# Patient Record
Sex: FEMALE | Race: WHITE | URBAN - METROPOLITAN AREA
[De-identification: names, ages, dates, MRNs, and addresses within clinical notes are randomized per-mention and may not be internally consistent; named-entity substitution may affect disease eponyms.]

---

## 2017-07-11 ENCOUNTER — OFFICE VISIT (OUTPATIENT)
Dept: OPHTHALMOLOGY | Facility: CLINIC | Age: 72
End: 2017-07-11

## 2017-07-11 DIAGNOSIS — H52.203 MYOPIC ASTIGMATISM OF BOTH EYES: ICD-10-CM

## 2017-07-11 DIAGNOSIS — H25.13 AGE-RELATED NUCLEAR CATARACT OF BOTH EYES: Primary | ICD-10-CM

## 2017-07-11 DIAGNOSIS — H52.4 PRESBYOPIA: ICD-10-CM

## 2017-07-11 DIAGNOSIS — H52.13 MYOPIC ASTIGMATISM OF BOTH EYES: ICD-10-CM

## 2017-07-11 ASSESSMENT — SLIT LAMP EXAM - LIDS
COMMENTS: NORMAL
COMMENTS: NORMAL

## 2017-07-11 ASSESSMENT — EXTERNAL EXAM - LEFT EYE: OS_EXAM: NORMAL

## 2017-07-11 ASSESSMENT — REFRACTION_WEARINGRX
OD_ADD: +2.50
OD_AXIS: 035
OS_SPHERE: -3.25
OD_CYLINDER: +1.25
OS_ADD: +2.50
OD_SPHERE: -3.50
OS_AXIS: 150
OS_CYLINDER: +0.75

## 2017-07-11 ASSESSMENT — TONOMETRY
OS_IOP_MMHG: 21
OD_IOP_MMHG: 21
IOP_METHOD: APPLANATION

## 2017-07-11 ASSESSMENT — REFRACTION_MANIFEST
OD_AXIS: 035
OD_SPHERE: -3.50
OS_SPHERE: -3.00
OS_CYLINDER: +0.50
OD_CYLINDER: +1.00
OS_AXIS: 140

## 2017-07-11 ASSESSMENT — VISUAL ACUITY
OS_PH_CC: 20/20
METHOD: SNELLEN - LINEAR
OD_CC: 20/20
OS_CC: 20/25

## 2017-07-11 ASSESSMENT — CONF VISUAL FIELD
METHOD: COUNTING FINGERS
OS_NORMAL: 1
OD_NORMAL: 1

## 2017-07-11 ASSESSMENT — CUP TO DISC RATIO
OD_RATIO: 0.2
OS_RATIO: 0.25

## 2017-07-11 ASSESSMENT — EXTERNAL EXAM - RIGHT EYE: OD_EXAM: NORMAL

## 2017-07-11 NOTE — MR AVS SNAPSHOT
After Visit Summary   2017    Rika Newman    MRN: [id number]           Patient Information     Date Of Birth          1945        Visit Information        Provider Department      2017 1:30 PM Shelby Silverio MD [facility]        Today's Diagnoses     Age-related nuclear cataract of both eyes    -  1    Myopic astigmatism of both eyes        Presbyopia           Follow-ups after your visit        Follow-up notes from your care team     Return in about 1 year (around 2018).      Who to contact     Please call your clinic at [phone] to:    Ask questions about your health    Make or cancel appointments    Discuss your medicines    Learn about your test results    Speak to your doctor   If you have compliments or concerns about an experience at your clinic, or if you wish to file a complaint, please contact [facility] Patient Relations at [phone] or email us at [email or web address]         Additional Information About Your Visit        MyChart Information     [employer] is an electronic gateway that provides easy, online access to your medical records. With [employer], you can request a clinic appointment, read your test results, renew a prescription or communicate with your care team.     To sign up for [employer] visit the website at [email or web address]   You will be asked to enter the access code listed below, as well as some personal information. Please follow the directions to create your username and password.     Your access code is: [email or web address]  Expires: 2017  6:31 AM     Your access code will  in 90 days. If you need help or a new code, please contact your [facility] or call [phone] for assistance.        Care EveryWhere ID     This is your Care EveryWhere ID. This could be used by other organizations to access your [employer]  records  [id number]         Blood Pressure from Last 3 Encounters:   No data found for BP    Weight from Last 3 Encounters:   No data found for Wt              Today, you had the following     No orders found for display       Primary Care Provider Office Phone # Fax #    Rachel Tapia [phone]       [facility] [address]  [place] [address]        Equal Access to Services     [facility] : Hadii aad ku hadasho Soomaali, waaxda luqadaha, qaybta kaalmada adeegyada, waxay idiin hayaan adeeg kharash la'aan . So [facility] [phone].    ATENCIÓN: Si minerva ross, tiene a morales disposición servicios gratuitos de asistencia lingüística. Llame al [phone].    We comply with applicable federal civil rights laws and [place] laws. We do not discriminate on the basis of race, color, national origin, age, disability sex, sexual orientation or gender identity.            Thank you!     Thank you for choosing [facility]  for your care. Our goal is always to provide you with excellent care. Hearing back from our patients is one way we can continue to improve our services. Please take a few minutes to complete the written survey that you may receive in the mail after your visit with us. Thank you!             Your Updated Medication List - Protect others around you: Learn how to safely use, store and throw away your medicines at www.disposemymeds.org.          This list is accurate as of: 7/11/17  2:21 PM.  Always use your most recent med list.                   Brand Name Dispense Instructions for use Diagnosis    ascorbic acid 1000 MG Tabs    vitamin C     Take 1,000 mg by mouth daily        aspirin - buffered 325 MG Tabs tablet    ASCRIPTIN     Take 325 mg by mouth daily        CALTRATE 600 PO      Take 1 tablet by mouth daily        VITAMIN D3 PO      Take 2,000 Int'l Units by mouth daily

## 2017-07-11 NOTE — PROGRESS NOTES
HPI  Rika Newman is a 71 year old female here for yearly eye exam. She feels her vision is good and stable in both eyes with her current glasses. No eye pain, redness, discharge. No flashes/floaters.    Assessment & Plan      (H52.203) Myopic astigmatism of both eyes/(H52.4) Presbyopia  Comment: Good vision with minimal change in refraction  Plan: Given updated glasses Rx for her records    (H25.13) Age-related nuclear cataract of both eyes   Comment: Not visually significant  Plan: Observe     -----------------------------------------------------------------------------------    Patient disposition:   Return in about 1 year (around 7/11/2018). or sooner as needed.    Teaching statement:  Complete documentation of historical and exam elements from today's encounter can be found in the full encounter summary report (not reduplicated in this progress note). I personally obtained the chief complaint(s) and history of present illness.  I confirmed and edited as necessary the review of systems, past medical/surgical history, family history, social history, and examination findings as documented by others; and I examined the patient myself. I personally reviewed the relevant tests, images, and reports as documented above.     I formulated and edited as necessary the assessment and plan and discussed the findings and management plan with the patient and family.      Shelby Silverio MD  Comprehensive Ophthalmology & Ocular Pathology  Department of Ophthalmology and Visual Neurosciences  [email or web address]  Pager [phone]

## 2018-11-20 ENCOUNTER — OFFICE VISIT (OUTPATIENT)
Dept: OPHTHALMOLOGY | Facility: CLINIC | Age: 73
End: 2018-11-20
Payer: MEDICARE

## 2018-11-20 DIAGNOSIS — H52.4 PRESBYOPIA: ICD-10-CM

## 2018-11-20 DIAGNOSIS — H52.203 MYOPIC ASTIGMATISM OF BOTH EYES: ICD-10-CM

## 2018-11-20 DIAGNOSIS — H52.13 MYOPIC ASTIGMATISM OF BOTH EYES: ICD-10-CM

## 2018-11-20 DIAGNOSIS — H25.13 NUCLEAR SCLEROTIC CATARACT OF BOTH EYES: Primary | ICD-10-CM

## 2018-11-20 ASSESSMENT — VISUAL ACUITY
OD_CC: 20/15-3
OS_CC: 20/25-3
METHOD: SNELLEN - LINEAR

## 2018-11-20 ASSESSMENT — REFRACTION_WEARINGRX
OS_AXIS: 150
OS_ADD: +2.50
OS_CYLINDER: +0.75
OD_ADD: +2.50
OS_SPHERE: -3.25
OD_CYLINDER: +1.25
OD_SPHERE: -3.50
OD_AXIS: 035

## 2018-11-20 ASSESSMENT — REFRACTION_MANIFEST
OD_AXIS: 060
OD_CYLINDER: +1.00
OS_AXIS: 120
OS_ADD: +2.75
OS_CYLINDER: +0.75
OD_SPHERE: -3.50
OD_ADD: +2.75
OS_SPHERE: -2.50

## 2018-11-20 ASSESSMENT — EXTERNAL EXAM - LEFT EYE: OS_EXAM: NORMAL

## 2018-11-20 ASSESSMENT — CUP TO DISC RATIO
OD_RATIO: 0.2
OS_RATIO: 0.25

## 2018-11-20 ASSESSMENT — TONOMETRY
IOP_METHOD: TONOPEN
OS_IOP_MMHG: 19
OD_IOP_MMHG: 21

## 2018-11-20 ASSESSMENT — SLIT LAMP EXAM - LIDS
COMMENTS: NORMAL
COMMENTS: NORMAL

## 2018-11-20 ASSESSMENT — CONF VISUAL FIELD
OS_NORMAL: 1
OD_NORMAL: 1
METHOD: COUNTING FINGERS

## 2018-11-20 ASSESSMENT — EXTERNAL EXAM - RIGHT EYE: OD_EXAM: NORMAL

## 2018-11-20 NOTE — PROGRESS NOTES
HPI  Rika Newman is a 73 year old female here for yearly eye exam. She feels her vision is good and stable in both eyes with her current glasses. No eye pain, redness, discharge. No flashes/floaters.    Assessment & Plan      (H52.203) Myopic astigmatism of both eyes/(H52.4) Presbyopia  Comment: Good vision with refraction  Plan: Given updated glasses Rx    (H25.13) Age-related nuclear cataract of both eyes   Comment: Not visually significant  Plan: Observe     -----------------------------------------------------------------------------------    Patient disposition:   Return in about 1 year (around 11/20/2019). or sooner as needed.    Teaching statement:  Complete documentation of historical and exam elements from today's encounter can be found in the full encounter summary report (not reduplicated in this progress note). I personally obtained the chief complaint(s) and history of present illness.  I confirmed and edited as necessary the review of systems, past medical/surgical history, family history, social history, and examination findings as documented by others; and I examined the patient myself. I personally reviewed the relevant tests, images, and reports as documented above.     I formulated and edited as necessary the assessment and plan and discussed the findings and management plan with the patient and family.      Shelby Silverio MD  Comprehensive Ophthalmology & Ocular Pathology  Department of Ophthalmology and Visual Neurosciences  [email or web address]  Pager [phone]

## 2018-11-20 NOTE — MR AVS SNAPSHOT
After Visit Summary   2018    Rika Newman    MRN: [id number]           Patient Information     Date Of Birth          1945        Visit Information        Provider Department      2018 2:15 PM Shelby Silverio MD [facility]        Today's Diagnoses     Nuclear sclerotic cataract of both eyes    -  1    Myopic astigmatism of both eyes        Presbyopia           Follow-ups after your visit        Follow-up notes from your care team     Return in about 1 year (around 2019).      Who to contact     Please call your clinic at [phone] to:    Ask questions about your health    Make or cancel appointments    Discuss your medicines    Learn about your test results    Speak to your doctor            Additional Information About Your Visit        MyChart Information     [employer] is an electronic gateway that provides easy, online access to your medical records. With [employer], you can request a clinic appointment, read your test results, renew a prescription or communicate with your care team.     To sign up for My Digital Lifet visit the website at [email or web address]   You will be asked to enter the access code listed below, as well as some personal information. Please follow the directions to create your username and password.     Your access code is: [email or web address]  Expires: 2019  6:30 AM     Your access code will  in 90 days. If you need help or a new code, please contact your [facility] or call [phone] for assistance.        Care EveryWhere ID     This is your Care EveryWhere ID. This could be used by other organizations to access your [place] medical records  [id number]         Blood Pressure from Last 3 Encounters:   No data found for BP    Weight from Last 3 Encounters:   No data found for Wt              Today, you had the following     No orders found for display         Today's Medication Changes           These changes are accurate as of 11/20/18  3:01 PM.  If you have any questions, ask your nurse or doctor.               Stop taking these medicines if you haven't already. Please contact your care team if you have questions.     aspirin - buffered 325 MG Tabs tablet   Commonly known as:  ASCRIPTIN   Stopped by:  Shelby Silverio MD                    Primary Care Provider Office Phone # Fax #    Rachel Tapia [phone]       [facility] [address] [facility] [address]  [facility] [address]        Equal Access to Services     [facility]: Hadii aad ku hadasho Soomaali, waaxda luqadaha, qaybta kaalmada adeegyada, waxay saleemin hayaan ademynor santana . So [facility] [phone].    ATENCIÓN: Si habla español, tiene a morales disposición servicios gratuitos de asistencia lingüística. [facility] [phone].    We comply with applicable federal civil rights laws and [place] laws. We do not discriminate on the basis of race, color, national origin, age, disability, sex, sexual orientation, or gender identity.            Thank you!     Thank you for choosing [facility]  for your care. Our goal is always to provide you with excellent care. Hearing back from our patients is one way we can continue to improve our services. Please take a few minutes to complete the written survey that you may receive in the mail after your visit with us. Thank you!             Your Updated Medication List - Protect others around you: Learn how to safely use, store and throw away your medicines at www.disposemymeds.org.          This list is accurate as of 11/20/18  3:01 PM.  Always use your most recent med list.                   Brand Name Dispense Instructions for use Diagnosis    ascorbic acid 1000 MG Tabs    vitamin C     Take 1,000 mg by mouth daily        CALTRATE 600 PO      Take 1 tablet by mouth daily        PROBIOTIC PO           VITAMIN D3 PO      Take 2,000 Int'l Units by mouth  daily

## 2020-01-07 ENCOUNTER — OFFICE VISIT (OUTPATIENT)
Dept: OPHTHALMOLOGY | Facility: CLINIC | Age: 75
End: 2020-01-07
Attending: OPHTHALMOLOGY
Payer: MEDICARE

## 2020-01-07 DIAGNOSIS — H52.203 MYOPIC ASTIGMATISM OF BOTH EYES: Primary | ICD-10-CM

## 2020-01-07 DIAGNOSIS — H52.13 MYOPIC ASTIGMATISM OF BOTH EYES: Primary | ICD-10-CM

## 2020-01-07 DIAGNOSIS — H25.13 NUCLEAR SCLEROTIC CATARACT OF BOTH EYES: ICD-10-CM

## 2020-01-07 DIAGNOSIS — H52.4 PRESBYOPIA: ICD-10-CM

## 2020-01-07 PROCEDURE — G0463 HOSPITAL OUTPT CLINIC VISIT: HCPCS | Mod: ZF

## 2020-01-07 ASSESSMENT — VISUAL ACUITY
METHOD: SNELLEN - LINEAR
OD_SC: J1+
CORRECTION_TYPE: GLASSES
OD_CC: 20/20
OS_CC: 20/20
OS_CC+: -1
OS_SC: J1+

## 2020-01-07 ASSESSMENT — REFRACTION_MANIFEST
OS_CYLINDER: +0.75
OS_AXIS: 120
OS_SPHERE: -2.50
OS_ADD: +2.75
OD_ADD: +2.75
OD_SPHERE: -3.50
OD_AXIS: 060
OD_CYLINDER: +1.00

## 2020-01-07 ASSESSMENT — REFRACTION_WEARINGRX
OD_ADD: +2.75
OS_CYLINDER: +0.75
SPECS_TYPE: PAL
OD_CYLINDER: +1.00
OS_ADD: +2.75
OD_SPHERE: -3.50
OD_AXIS: 060
OS_SPHERE: -2.50
OS_AXIS: 120

## 2020-01-07 ASSESSMENT — CUP TO DISC RATIO
OD_RATIO: 0.2
OS_RATIO: 0.25

## 2020-01-07 ASSESSMENT — TONOMETRY
OD_IOP_MMHG: 23
OS_IOP_MMHG: 23
IOP_METHOD: APPLANATION

## 2020-01-07 ASSESSMENT — EXTERNAL EXAM - RIGHT EYE: OD_EXAM: NORMAL

## 2020-01-07 ASSESSMENT — CONF VISUAL FIELD
OS_NORMAL: 1
METHOD: COUNTING FINGERS
OD_NORMAL: 1

## 2020-01-07 ASSESSMENT — SLIT LAMP EXAM - LIDS
COMMENTS: NORMAL
COMMENTS: NORMAL

## 2020-01-07 ASSESSMENT — EXTERNAL EXAM - LEFT EYE: OS_EXAM: NORMAL

## 2020-01-07 NOTE — PROGRESS NOTES
HPI  Rika Newman is a 74 year old female here for yearly eye exam. She feels her vision continues to be good and stable in both eyes with her current glasses, but they are scratched so she needs an updated Rx. No eye pain, redness, discharge. No flashes/floaters.    Assessment & Plan      (H52.203) Myopic astigmatism of both eyes/(H52.4) Presbyopia  Comment: Good vision with refraction  Plan: Given updated glasses Rx    (H25.13) Age-related nuclear cataract of both eyes   Comment: Not visually significant  Plan: Observe    -----------------------------------------------------------------------------------    Patient disposition:   Return in about 1 year (around 1/7/2021). or sooner as needed.    Teaching statement:  Complete documentation of historical and exam elements from today's encounter can be found in the full encounter summary report (not reduplicated in this progress note). I personally obtained the chief complaint(s) and history of present illness.  I confirmed and edited as necessary the review of systems, past medical/surgical history, family history, social history, and examination findings as documented by others; and I examined the patient myself. I personally reviewed the relevant tests, images, and reports as documented above.     I formulated and edited as necessary the assessment and plan and discussed the findings and management plan with the patient and family.      Shelby Silverio MD  Comprehensive Ophthalmology & Ocular Pathology  Department of Ophthalmology and Visual Neurosciences  [email or web address]  Pager [phone]

## 2020-01-07 NOTE — NURSING NOTE
Chief Complaints and History of Present Illnesses   Patient presents with     Annual Eye Exam     CEE.  Nuclear sclerotic cataract of both eyes      Chief Complaint(s) and History of Present Illness(es)     Annual Eye Exam     Associated symptoms: Negative for eye pain, dryness, tearing, haloes, glare, flashes and floaters    Comments: CEE.  Nuclear sclerotic cataract of both eyes               Comments     Pt states that vision is stable since the last visit.  Pt states they don't have any flashes or floaters any more.  Its been about 2 years since she has seen the flashes and floaters.  Pt states reading is just as easy with glasses and without.  Pt has no pain or other concerns at this time.    ANNA Waggoner January 7, 2020 10:32 AM

## 2021-09-22 ENCOUNTER — OFFICE VISIT (OUTPATIENT)
Dept: OPHTHALMOLOGY | Facility: CLINIC | Age: 76
End: 2021-09-22
Attending: OPHTHALMOLOGY
Payer: MEDICARE

## 2021-09-22 DIAGNOSIS — H52.13 MYOPIC ASTIGMATISM OF BOTH EYES: ICD-10-CM

## 2021-09-22 DIAGNOSIS — H52.203 MYOPIC ASTIGMATISM OF BOTH EYES: ICD-10-CM

## 2021-09-22 DIAGNOSIS — H52.4 PRESBYOPIA: ICD-10-CM

## 2021-09-22 DIAGNOSIS — H25.11 AGE-RELATED NUCLEAR CATARACT, RIGHT: Primary | ICD-10-CM

## 2021-09-22 PROCEDURE — 92015 DETERMINE REFRACTIVE STATE: CPT | Mod: GY

## 2021-09-22 PROCEDURE — 92014 COMPRE OPH EXAM EST PT 1/>: CPT | Performed by: OPHTHALMOLOGY

## 2021-09-22 PROCEDURE — G0463 HOSPITAL OUTPT CLINIC VISIT: HCPCS

## 2021-09-22 ASSESSMENT — REFRACTION_MANIFEST
OS_CYLINDER: +1.00
OD_SPHERE: -3.25
OD_ADD: +3.00
OS_AXIS: 137
OS_ADD: +3.00
OS_SPHERE: -2.50
OD_AXIS: 046
OD_CYLINDER: +0.75

## 2021-09-22 ASSESSMENT — REFRACTION_WEARINGRX
OD_SPHERE: -3.50
OS_ADD: +2.75
OD_ADD: +2.75
OS_AXIS: 120
OD_AXIS: 060
SPECS_TYPE: PAL
OS_CYLINDER: +0.75
OD_CYLINDER: +1.00
OS_SPHERE: -2.50

## 2021-09-22 ASSESSMENT — VISUAL ACUITY
METHOD: SNELLEN - LINEAR
OD_CC: J1+
OD_CC+: -1
OS_CC: 20/20
OS_CC: J1+
OS_CC+: -1
OD_CC: 20/20

## 2021-09-22 ASSESSMENT — EXTERNAL EXAM - RIGHT EYE: OD_EXAM: NORMAL

## 2021-09-22 ASSESSMENT — SLIT LAMP EXAM - LIDS
COMMENTS: NORMAL
COMMENTS: NORMAL

## 2021-09-22 ASSESSMENT — CUP TO DISC RATIO
OD_RATIO: 0.2
OS_RATIO: 0.25

## 2021-09-22 ASSESSMENT — TONOMETRY
OD_IOP_MMHG: 18
IOP_METHOD: TONOPEN
OS_IOP_MMHG: 18

## 2021-09-22 ASSESSMENT — CONF VISUAL FIELD
OS_NORMAL: 1
OD_NORMAL: 1
METHOD: COUNTING FINGERS

## 2021-09-22 ASSESSMENT — EXTERNAL EXAM - LEFT EYE: OS_EXAM: NORMAL

## 2021-09-22 NOTE — NURSING NOTE
Chief Complaints and History of Present Illnesses   Patient presents with     Annual Eye Exam     Chief Complaint(s) and History of Present Illness(es)     Annual Eye Exam     Laterality: both eyes    Onset: gradual    Severity: moderate    Frequency: constantly    Timing: throughout the day    Associated symptoms: Negative for eye pain, floaters, flashes and dryness    Pain scale: 0/10              Comments     Rika is here for her annual eye exam. She has a history of Myopic astigmatism of both eyes. She states vision seems about the same as last visit.     Garland Steward COT 12:15 PM September 22, 2021

## 2021-09-22 NOTE — PROGRESS NOTES
HPI     Annual Eye Exam     In both eyes.  Onset was gradual.  Severity is moderate.  Occurring constantly.  It is worse throughout the day.  Associated symptoms include Negative for eye pain, floaters, flashes and dryness.  Pain was noted as 0/10.              Comments     Rika is here for her annual eye exam. She has a history of Myopic astigmatism of both eyes. She states vision seems about the same as last visit.     Garland Steward COT 12:15 PM September 22, 2021             Last edited by Garland Steward on 9/22/2021 12:15 PM. (History)          HPI  Rika Newman is a 75 year old female here for yearly eye exam. She feels her vision continues to be good and stable in both eyes with her current glasses. No eye pain, redness, discharge. No flashes/floaters.    Assessment & Plan      (H25.13) Age-related nuclear cataract of both eyes   Comment: Not visually significant  Plan: Observe    (H52.203) Myopic astigmatism of both eyes/(H52.4) Presbyopia  Comment: Good vision with refraction  Plan: Given updated glasses Rx  -----------------------------------------------------------------------------------    Patient disposition:   Return in about 1 year (around 9/22/2022) for dilated eye exam, or sooner as needed.     Teaching statement:  Complete documentation of historical and exam elements from today's encounter can be found in the full encounter summary report (not reduplicated in this progress note). I personally obtained the chief complaint(s) and history of present illness.  I confirmed and edited as necessary the review of systems, past medical/surgical history, family history, social history, and examination findings as documented by others; and I examined the patient myself. I personally reviewed the relevant tests, images, and reports as documented above.     I formulated and edited as necessary the assessment and plan and discussed the findings and management plan with the patient and family.      Shelby AVENDANO  MD Jean Claude  Comprehensive Ophthalmology & Ocular Pathology  Department of Ophthalmology and Visual Neurosciences  [email or web address]  Pager [phone]